# Patient Record
Sex: FEMALE | Race: WHITE | NOT HISPANIC OR LATINO
[De-identification: names, ages, dates, MRNs, and addresses within clinical notes are randomized per-mention and may not be internally consistent; named-entity substitution may affect disease eponyms.]

---

## 2021-09-28 PROBLEM — Z00.00 ENCOUNTER FOR PREVENTIVE HEALTH EXAMINATION: Status: ACTIVE | Noted: 2021-09-28

## 2021-11-16 ENCOUNTER — NON-APPOINTMENT (OUTPATIENT)
Age: 26
End: 2021-11-16

## 2021-11-17 ENCOUNTER — APPOINTMENT (OUTPATIENT)
Dept: HEMATOLOGY ONCOLOGY | Facility: CLINIC | Age: 26
End: 2021-11-17
Payer: COMMERCIAL

## 2021-11-17 VITALS
WEIGHT: 286 LBS | BODY MASS INDEX: 44.89 KG/M2 | HEIGHT: 67 IN | HEART RATE: 120 BPM | DIASTOLIC BLOOD PRESSURE: 82 MMHG | SYSTOLIC BLOOD PRESSURE: 140 MMHG | OXYGEN SATURATION: 99 % | TEMPERATURE: 96.9 F

## 2021-11-17 DIAGNOSIS — Z82.69 FAMILY HISTORY OF OTHER DISEASES OF THE MUSCULOSKELETAL SYSTEM AND CONNECTIVE TISSUE: ICD-10-CM

## 2021-11-17 DIAGNOSIS — Z80.7 FAMILY HISTORY OF OTHER MALIGNANT NEOPLASMS OF LYMPHOID, HEMATOPOIETIC AND RELATED TISSUES: ICD-10-CM

## 2021-11-17 DIAGNOSIS — Z78.9 OTHER SPECIFIED HEALTH STATUS: ICD-10-CM

## 2021-11-17 DIAGNOSIS — Z81.8 FAMILY HISTORY OF OTHER MENTAL AND BEHAVIORAL DISORDERS: ICD-10-CM

## 2021-11-17 DIAGNOSIS — F17.200 NICOTINE DEPENDENCE, UNSPECIFIED, UNCOMPLICATED: ICD-10-CM

## 2021-11-17 DIAGNOSIS — Z90.49 ACQUIRED ABSENCE OF OTHER SPECIFIED PARTS OF DIGESTIVE TRACT: ICD-10-CM

## 2021-11-17 DIAGNOSIS — Z82.49 FAMILY HISTORY OF ISCHEMIC HEART DISEASE AND OTHER DISEASES OF THE CIRCULATORY SYSTEM: ICD-10-CM

## 2021-11-17 DIAGNOSIS — Z86.19 PERSONAL HISTORY OF OTHER INFECTIOUS AND PARASITIC DISEASES: ICD-10-CM

## 2021-11-17 DIAGNOSIS — Z83.49 FAMILY HISTORY OF OTHER ENDOCRINE, NUTRITIONAL AND METABOLIC DISEASES: ICD-10-CM

## 2021-11-17 DIAGNOSIS — Z82.5 FAMILY HISTORY OF ASTHMA AND OTHER CHRONIC LOWER RESPIRATORY DISEASES: ICD-10-CM

## 2021-11-17 DIAGNOSIS — E66.9 OBESITY, UNSPECIFIED: ICD-10-CM

## 2021-11-17 DIAGNOSIS — R76.8 OTHER SPECIFIED ABNORMAL IMMUNOLOGICAL FINDINGS IN SERUM: ICD-10-CM

## 2021-11-17 DIAGNOSIS — Z83.3 FAMILY HISTORY OF DIABETES MELLITUS: ICD-10-CM

## 2021-11-17 PROCEDURE — 99244 OFF/OP CNSLTJ NEW/EST MOD 40: CPT | Mod: 25

## 2021-11-17 PROCEDURE — 36415 COLL VENOUS BLD VENIPUNCTURE: CPT

## 2021-11-17 RX ORDER — ADHESIVE TAPE 3"X 2.3 YD
50 MCG TAPE, NON-MEDICATED TOPICAL
Refills: 0 | Status: ACTIVE | COMMUNITY

## 2021-11-17 NOTE — CONSULT LETTER
[Dear  ___] : Dear ~ANGELINE, [Consult Letter:] : I had the pleasure of evaluating your patient, [unfilled]. [( Thank you for referring [unfilled] for consultation for _____ )] : Thank you for referring [unfilled] for consultation for [unfilled] [Please see my note below.] : Please see my note below. [Consult Closing:] : Thank you very much for allowing me to participate in the care of this patient.  If you have any questions, please do not hesitate to contact me. [Sincerely,] : Sincerely, [FreeTextEntry3] : Edelmira Wheeler

## 2021-11-17 NOTE — END OF VISIT
[FreeTextEntry3] : All medical record entries made by the Scribe were at my, Dr. Edelmira Wheeler, direction and personally dictated by me on 11/17/2021. I have reviewed the chart and agree that the record accurately reflects my personal performance of the history, physical exam, assessment and plan. I have also personally directed, reviewed, and agreed with the chart.

## 2021-11-17 NOTE — ASSESSMENT
[FreeTextEntry1] : Patient is a 26 year old female with a history of mononucleosis, obesity and otherwise in good general health, who is referred for evaluation of chronic leukocytosis. Elevated white blood count in this patient is likely reactive to an inflammatory condition,  patient's weight and smoking habit. This was conveyed to the patient.. Will also evaluate for other possible etiologies, including autoimmune diseases, thyroid disorder or myeloproliferative disorders . Have ordered BRENTON, BCR/ABL, CBC, CMP, CRP, erythropoietin, flow cytometry, CALR, JAK2, MPL, rheumatoid factor, sed rate, and TSH. Patient was advised to return to office to discuss results and further recommendations.

## 2021-11-17 NOTE — PHYSICAL EXAM
[Obese] : obese [Normal] : affect appropriate [de-identified] : tachycardic [de-identified] : obese [de-identified] : Multiple tattoos on trunk and upper and lower extremities

## 2021-11-17 NOTE — ADDENDUM
[FreeTextEntry1] : I, Cara Tolliver, acted solely as a scribe for Dr. Edelmira Wheeler on 11/17/2021

## 2021-11-17 NOTE — REVIEW OF SYSTEMS
[Fatigue] : fatigue [Joint Stiffness] : joint stiffness [Anxiety] : anxiety [Negative] : Allergic/Immunologic [Fever] : no fever [Chills] : no chills [Night Sweats] : no night sweats [Recent Change In Weight] : ~T no recent weight change [Vomiting] : no vomiting [Constipation] : no constipation [Diarrhea] : no diarrhea [Joint Pain] : no joint pain [Muscle Pain] : no muscle pain [Muscle Weakness] : no muscle weakness [Skin Rash] : no skin rash [Depression] : no depression [Easy Bleeding] : no tendency for easy bleeding [Easy Bruising] : no tendency for easy bruising [FreeTextEntry7] : occasional abdominal cramping [FreeTextEntry9] : occasional hip discomfort

## 2021-11-17 NOTE — HISTORY OF PRESENT ILLNESS
[de-identified] : Patient is a 26 year old female with a history of mononucleosis,  obesity and otherwise in good general health, who is referred for evaluation of leukocytosis. Patient has been aware of a high white count since her teenage years, and was first found to have an elevated white blood count when she had mononucleosis. She had cholecystitis in 2019, at which time the white blood count was also elevated.In August of 2019, the WBC was 16.0 with increased neutrophils and the remainder of the CBC was normal. Most recently, at a routine physical in August 2021, the white blood count was found to be 17.7, with an increase in both neutrophils and lymphocytes, but with a normal hemoglobin, hematocrit and platelet count. She has also tested positive for hepatitis C antibodies. Of note, patient received facial Botox injections 2 weeks before the blood count was drawn. Patient complains of occasional hip discomfort, but otherwise feels generally well at this time, with no acute complaints. She is a smoker and admits to smoking ~5 cigarettes per day. Denies recent infections, fever, chills, and sweats.

## 2021-11-18 ENCOUNTER — LABORATORY RESULT (OUTPATIENT)
Age: 26
End: 2021-11-18

## 2021-11-18 LAB
ALBUMIN SERPL ELPH-MCNC: 4.6 G/DL
ALP BLD-CCNC: 99 U/L
ALT SERPL-CCNC: 15 U/L
ANION GAP SERPL CALC-SCNC: 14 MMOL/L
AST SERPL-CCNC: 24 U/L
BASOPHILS # BLD AUTO: 0 K/UL
BASOPHILS # BLD AUTO: 0 K/UL
BASOPHILS NFR BLD AUTO: 0 %
BASOPHILS NFR BLD AUTO: 0 %
BILIRUB SERPL-MCNC: 0.2 MG/DL
BUN SERPL-MCNC: 8 MG/DL
CALCIUM SERPL-MCNC: 10 MG/DL
CHLORIDE SERPL-SCNC: 103 MMOL/L
CO2 SERPL-SCNC: 24 MMOL/L
CREAT SERPL-MCNC: 0.77 MG/DL
CRP SERPL-MCNC: 26 MG/L
EOSINOPHIL # BLD AUTO: 0.14 K/UL
EOSINOPHIL # BLD AUTO: 0.14 K/UL
EOSINOPHIL NFR BLD AUTO: 0.9 %
EOSINOPHIL NFR BLD AUTO: 0.9 %
ERYTHROCYTE [SEDIMENTATION RATE] IN BLOOD BY WESTERGREN METHOD: 57 MM/HR
GIANT PLATELETS BLD QL SMEAR: PRESENT
GLUCOSE SERPL-MCNC: 96 MG/DL
HCT VFR BLD CALC: 41.7 %
HGB BLD-MCNC: 13.2 G/DL
HYPOCHROMIA BLD QL SMEAR: SLIGHT
LYMPHOCYTES # BLD AUTO: 2.62 K/UL
LYMPHOCYTES # BLD AUTO: 2.62 K/UL
LYMPHOCYTES NFR BLD AUTO: 17.4 %
LYMPHOCYTES NFR BLD AUTO: 17.4 %
MAN DIFF?: NORMAL
MCHC RBC-ENTMCNC: 27 PG
MCHC RBC-ENTMCNC: 31.7 GM/DL
MCV RBC AUTO: 85.5 FL
MONOCYTES # BLD AUTO: 0.65 K/UL
MONOCYTES # BLD AUTO: 0.65 K/UL
MONOCYTES NFR BLD AUTO: 4.3 %
MONOCYTES NFR BLD AUTO: 4.3 %
MSMEAR: NORMAL
NEUTROPHILS # BLD AUTO: 11.63 K/UL
NEUTROPHILS # BLD AUTO: 11.63 K/UL
NEUTROPHILS NFR BLD AUTO: 77.4 %
NEUTROPHILS NFR BLD AUTO: 77.4 %
PLAT MORPH BLD: ABNORMAL
PLATELET # BLD AUTO: 400 K/UL
POTASSIUM SERPL-SCNC: 4.1 MMOL/L
PROT SERPL-MCNC: 7.3 G/DL
RBC # BLD: 4.88 M/UL
RBC # FLD: 13.3 %
RBC BLD AUTO: ABNORMAL
RHEUMATOID FACT SER QL: <10 IU/ML
SODIUM SERPL-SCNC: 140 MMOL/L
TSH SERPL-ACNC: 2.26 UIU/ML
WBC # FLD AUTO: 15.03 K/UL

## 2021-11-19 LAB — EPO SERPL-MCNC: 9.4 MIU/ML

## 2021-11-22 LAB — ANA SER IF-ACNC: NEGATIVE

## 2021-11-23 LAB
AMINO ACID: NORMAL
ASSAY DETAILS: NORMAL
BLOCK/SPECIMEN ID: NORMAL
EXON: NORMAL
GENE: NORMAL
JAK2 12 INTERPRETATION: NORMAL
JAK2 12 MUTATIONS: NORMAL
JAK2 12 REFERENCE: NORMAL
JAK2 12-13 TYPE: NORMAL
JAK2 P.V617F BLD/T QL: NOT DETECTED
JAK2-SOURCE: NORMAL
Lab: NORMAL
NUKLEOID CHANGE: NORMAL

## 2021-11-24 ENCOUNTER — APPOINTMENT (OUTPATIENT)
Dept: HEMATOLOGY ONCOLOGY | Facility: CLINIC | Age: 26
End: 2021-11-24
Payer: COMMERCIAL

## 2021-11-24 VITALS
SYSTOLIC BLOOD PRESSURE: 140 MMHG | TEMPERATURE: 97.5 F | HEIGHT: 67 IN | HEART RATE: 131 BPM | DIASTOLIC BLOOD PRESSURE: 80 MMHG | BODY MASS INDEX: 44.42 KG/M2 | OXYGEN SATURATION: 100 % | WEIGHT: 283 LBS

## 2021-11-24 DIAGNOSIS — D72.9 DISORDER OF WHITE BLOOD CELLS, UNSPECIFIED: ICD-10-CM

## 2021-11-24 DIAGNOSIS — D72.829 ELEVATED WHITE BLOOD CELL COUNT, UNSPECIFIED: ICD-10-CM

## 2021-11-24 PROCEDURE — 99212 OFFICE O/P EST SF 10 MIN: CPT

## 2021-11-24 NOTE — ASSESSMENT
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 Blood results are consistent with the elevated white blood count being reactive to an inflammatory condition, likely due to patient's weight and smoking habit. Evaluation for thyroid, autoimmune, and myeloproliferative disorders was negative. Have asked patient to return in several months for monitoring. At that time, will also check a pyruvate kinase level.

## 2021-11-24 NOTE — ADDENDUM
[FreeTextEntry1] : I, Cara Tolliver, acted solely as a scribe for Dr. Edelmira Wheeler on 11/24/2021

## 2021-11-24 NOTE — END OF VISIT
[FreeTextEntry3] : All medical record entries made by the Scribe were at my, Dr. Edelmira Wheeler, direction and personally dictated by me on 11/24/2021. I have reviewed the chart and agree that the record accurately reflects my personal performance of the history, physical exam, assessment and plan. I have also personally directed, reviewed, and agreed with the chart.

## 2021-11-24 NOTE — HISTORY OF PRESENT ILLNESS
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 Patient has been aware of a high white count since her teenage years, and was first found to have an elevated white blood count when she had mononucleosis. She had cholecystitis in 2019, at which time the white blood count was also elevated. She has also tested positive for hepatitis C antibodies. At her last appointment on November 18, the white blood count was 15.03, with increased neutrophils as before, with the remainder of the CBC normal. No MPL, CALR, or JAK2 mutations were detected. Flow cytometry, BRENTON, rheumatoid factor, erythropoietin level, TSH, and comprehensive metabolic panel were normal/negative. Sedimentation rate was elevated at 57, and C-reactive protein was elevated at 26. Patient is a smoker and admits to smoking ~5 cigarettes per day. She has occasional joint discomfort in the hips. 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

## 2021-11-29 ENCOUNTER — APPOINTMENT (OUTPATIENT)
Dept: HEMATOLOGY ONCOLOGY | Facility: CLINIC | Age: 26
End: 2021-11-29